# Patient Record
Sex: FEMALE | Race: WHITE | NOT HISPANIC OR LATINO | URBAN - METROPOLITAN AREA
[De-identification: names, ages, dates, MRNs, and addresses within clinical notes are randomized per-mention and may not be internally consistent; named-entity substitution may affect disease eponyms.]

---

## 2023-09-30 NOTE — H&P (VIEW-ONLY)
History of Present Illness:   Admission Reason: Left femur fracture   HPI:    KRISTAL SHAH is a 67 year old Female with pertinent medical history of hypothyroidism who presented to [facility] following a mechanical fall on her left hip  from home.  Patient denies having any other medical history or surgeries.  Patient reports that she was cleaning and had pictures in her hand and turned around and tripped on a board landing on her left hip.  She attempted to get up and was unable to  walk.  Patient denies LOC or hitting her head.  Patient denies use of blood thinners/anticoagulation.  Patient denies recent: fever, chills, headache, dizziness, chest pain/ palpitations, shortness of breath, cough, abdominal pain, N/V/D, dysuria, or  hematuria.     ED course:  Initial VS: Temp 37.0, HR 62, RR 18, /84, SPO2 98% on room air.  CBC with WBC 12.6 likely reactive.  BMP/PT/INR: Unremarkable.  Left hip and femur fracture: Acute impacted and slightly rotated left hip fracture.  Acute left femoral neck fracture.  CT pelvis without contrast: Acute left femoral neck fracture complete, mildly displaced.  Chest x-ray: Negative.  While in the ED patient received morphine 2 mg IV x1 and Zofran 4 mg IV x1.    Patient admitted inpatient with left hip fracture.    Past medical history:Hypothyroidism    Past surgical history:None    Social history:Current tobacco use of 8 to 10 cigarettes/day (on and off since age 16), occasional alcohol usage, denies illicit drug use or medical marijuana card.    Family history: Father:  of an MI at age 89.  Mother: COPD and  from an enlarged heart complications.  Maternal grandmother: Diabetes mellitus.    Past Medical/Surgical History:        Medical History:   Hypothyroidism:     Social History:   Social History:  Smoking Status moderate user (uses 11-30 cig/day, OR 0.5-1.5 ppd, OR 2-3 cans/pouches loose leaf tobacco per week, OR 0.5-1.5 vape pods per day)  (1)   Alcohol  Use occasionally   Drug Use denies (1)              Allergies:  ·  penicillin : Unknown    Medications Prior to Admission:   Admission Medication Reconciliation has not been completed for this patient.    Review of Systems:   Constitutional: NEGATIVE: Fever, Chills     Eyes: NEGATIVE: Blurry Vision, Redness, Vision Loss/  Change     ENMT: NEGATIVE: Nasal Discharge, Nasal Congestion,  Throat Pain     Respiratory: NEGATIVE: Dry Cough, Productive Cough,  Shortness of Breath     Cardiac: NEGATIVE: Chest Pain, Palpitations, Syncope     Gastrointestinal: NEGATIVE: Nausea, Vomiting, Diarrhea,  Constipation, Abdominal Pain     Genitourinary: NEGATIVE: Dysuria, Frequency, Hematuria     Musculoskeletal: POSITIVE: Decreased ROM, Pain, Weakness     Neurological: NEGATIVE: Dizziness, Headache     Psychiatric: NEGATIVE: Anxiety     Skin: NEGATIVE: Rash, Ulcer     Endocrine: NEGATIVE: Polyuria     Hematologic/Lymph: NEGATIVE: Bruising     Allergic/Immunologic: NEGATIVE: Itching       Objective:     Objective Information:      T   P  R  BP   MAP  SpO2   Value  36.1  69  18  148/84   96  95%  Date/Time 9/18 23:35 9/18 23:35 9/18 23:35 9/18 23:35  9/18 23:35 9/18 23:35  Range  (36.1C - 37C )  (60 - 69 )  (18 - 18 )  (136 - 148 )/ (83 - 84 )  (96 - 96 )  (95% - 99% )  Highest temp of 37 C was recorded at 9/18 19:48      Pain reported at 9/18 19:48: 7 = Severe     Weights   9/18 23:46: Weight in kg (Weight (kg))  74.8  9/18 23:46: Weight in lbs ((lbs))  164.9  9/18 23:46: BMI (kg/m2) (BMI (kg/m2))  27.441    Physical Exam by System:    Constitutional: Awake/alert, calm, pleasant, and  cooperative.  Clear speech. no acute distress noted.   Eyes: PERRL, clear sclera, no swelling or draining  noted   ENMT: mucous membranes pink and moist, no apparent  injury, no lesions seen   Head/Neck: Neck supple, no apparent injury, trachea  midline, no palpable masses on head.   Respiratory/Thorax: CTAB, respirations even and unlabored,  symmetrical  chest rise   Cardiovascular: Regular rhythm and rate, auscultated  S1 and S2, no murmurs   Gastrointestinal: Nondistended, soft, non-tender,  no rebound tenderness or guarding, positive bowel sounds in all quadrants   Musculoskeletal: Decreased range of motion and strength  in left lower extremity, no joint swelling   Extremities: no edema noted, no cyanosis, 1+ pulses  of the extremities, see skin assessment for wounds   Neurological: alert and oriented x3, intact senses,  bilateral hand grasp and foot push/pulls equal.   Psychological: Appropriate mood and behavior   Skin: Pink, warm, and dry. No lesions or rashes     Medications:    Medications:          Continuous Medications       --------------------------------  No continuous medications are active       Scheduled Medications       --------------------------------    1. diazePAM (VALIUM):  5  mg  Oral  Once    2. Levothyroxine:  150  microgram(s)  Oral  Daily    3. Lidocaine 4% TransDermal:  1  patch  TransDermal  Every 24 Hours    4. Nicotine 21 mg/ 24 hour TransDermal:  1  patch  TransDermal  Every 24 Hours         PRN Medications       --------------------------------    1. diazePAM (VALIUM):  5  mg  Oral  Every 6 Hours    2. Morphine Injectable:  1  mg  IntraVenous Push  Every 4 Hours    3. Morphine Injectable:  2  mg  IntraVenous Push  Every 4 Hours    4. Morphine Injectable:  4  mg  IntraVenous Push  Every 4 Hours    5. Ondansetron Injectable:  4  mg  IntraVenous Push  Every 6 Hours    6. Sodium Chloride 0.9% Injectable Flush:  10  mL  IntraVenous Flush  Every 8 Hours and as Needed        Recent Lab Results:    Results:        I have reviewed these laboratory results:    Basic Metabolic Panel  18-Sep-2023 21:59:00      Result Value    Glucose, Serum  89    NA  137    K  3.7    CL  103    Bicarbonate, Serum  26    Anion Gap, Serum  12    BUN  16    CREAT  0.91    GFR Female  70    Calcium, Serum  9.6      Complete Blood Count  18-Sep-2023 21:59:00       Result Value    White Blood Cell Count  12.6   H   Nucleated Erythrocyte Count  0.0    Red Blood Cell Count  4.80    HGB  14.1    HCT  43.9    MCV  91    MCHC  32.1    PLT  239    RDW-CV  14.3      PT + INR, Plasma  18-Sep-2023 21:59:00      Result Value    Prothrombin Time, Plasma  12.6    International Normalized Ratio, Plasma  1.1        Radiology Results:    Results:        Impression:    1.  No evidence of acute cardiopulmonary process.           MACRO:  None     Xray Chest 1 View [Sep 18 2023 11:41PM]      Impression:    Acute left femoral neck fracture        MACRO:  None     Xray Femur Min 2 Views [Sep 18 2023 11:41PM]      Impression:    1.  Acute left femoral neck fracture; complete; mildly displaced.  Demineralization     MACRO:  None     CT Pelvis without Contrast [Sep 18 2023 11:40PM]      Impression:    Acute, impacted and slightly rotated left hip fracture.        MACRO:  None     Xray Hip 2 View [Sep 18 2023  9:18PM]      Assessment and Plan:   Problem List:       Admitting Dx:   Fracture of left hip:        Medical History:   Hypothyroidism:     Assessment:    Left hip fracture  Altered mobility  Leukocytosis  Hypothyroidism  Nicotine dependence    Plan:  Consult: Cardiology, orthopedics.  IVFs: LR at 75 MLS per hour x2 L .  Meds: Morphine 1 mg IV every 4 hours as needed for mild pain 1-3, morphine  2 mg IV every 4 hours as needed for moderate pain 4-6, morphine 4 mg IV every 4 hours as needed for severe pain 7-10, Zofran 4 mg IV every 6 hours as needed for nausea and/or vomiting, nicotine transdermal patch 21 mg per 24 hours, Valium 5 mg p.o. x1,  Valium 5 mg p.o. every 6 hours as needed for muscle spasms, Tylenol 650 mg p.o. every 6 hours x4 doses  Diet: Regular then n.p.o. after midnight except meds .  Vital signs with BP, HR, & RR Q 4 hours.  Pulse ox Q 4 hours.   Admission height and weight.  Labs ordered: CBC, BMP, hepatic panel, magnesium, phosphorus, BNP, lipid  panel.  Test ordered: KG  ".  Monitor / trend labs while inpatient.  Replace electrolytes as needed.  Aspiration precautions .  Out of bed with assistance   Fall precautions  PT/OT eval and treat as indicated once cleared by orthopedics.  Encourage & educate on smoking cessation .  Call physician for temperature < 36.5 or >38.0 degrees celsius.  Call physician for pulse <50 or >120.  Call physician for respiratory rate <10 or >22.  Call physician for systolic blood pressure <90 or >170.  Call physician for diastolic blood pressure < 50 or >100.  Call physician for pulse ox <92%.  See orders for further plan of care ordered.     VTE prophylaxis:  BLE SCDs.  Coagulation deferred to attending/consults  as patient to go to the OR on 9/19/2023.  Monitor for s/s of bleeding    Further evaluation, consults, and management per attending and consulting physicians.    This note has been transcribed using Dragon voice recognition system and there is a possibility of unintentional typing misprints.  Any information found  to be copied from previous providers is done in the best interest of the patient to provide accurate, quality, and continuity of care.     Plan of Care Reviewed With:  Plan of Care Reviewed With: patient; son     Attestation:   Note Completion:        Electronic Signatures:  Larry Norton)  (Signed 19-Sep-2023 12:49)   Co-Signer: Note Completion  Flower Corbin (APRN-CNP)  (Signed 28-Sep-2023 04:12)   Authored: History of Present Illness, Comorbidities,  Past Medical/Surgical History, Social History, Allergies, Medications Prior to Admission, Review of Systems, Objective, Assessment and Plan, Note Completion      Last Updated: 28-Sep-2023 04:12 by Flower Corbin (APRN-CNP)    References:  1.  Data Referenced From \"Patient Profile - Adult v2\" 18-Sep-2023 23:46   "

## 2023-10-08 PROBLEM — Z96.642 S/P TOTAL LEFT HIP ARTHROPLASTY: Status: ACTIVE | Noted: 2023-10-08

## 2023-10-08 RX ORDER — CEFAZOLIN SODIUM 2 G/100ML
2 INJECTION, SOLUTION INTRAVENOUS ONCE
Status: CANCELLED | OUTPATIENT
Start: 2023-10-08 | End: 2023-10-08

## 2023-10-09 ENCOUNTER — LAB (OUTPATIENT)
Dept: LAB | Facility: LAB | Age: 68
End: 2023-10-09
Payer: COMMERCIAL

## 2023-10-10 RX ORDER — ASPIRIN 81 MG/1
81 TABLET ORAL DAILY
COMMUNITY
End: 2023-10-12 | Stop reason: HOSPADM

## 2023-10-10 RX ORDER — DOCUSATE SODIUM 100 MG/1
100 CAPSULE, LIQUID FILLED ORAL 2 TIMES DAILY PRN
COMMUNITY

## 2023-10-10 RX ORDER — HYDROCODONE BITARTRATE AND ACETAMINOPHEN 5; 325 MG/1; MG/1
1 TABLET ORAL EVERY 4 HOURS PRN
Status: ON HOLD | COMMUNITY
End: 2023-10-12 | Stop reason: SDUPTHER

## 2023-10-10 RX ORDER — LEVOTHYROXINE SODIUM 150 UG/1
150 TABLET ORAL
COMMUNITY

## 2023-10-10 RX ORDER — IBUPROFEN 400 MG/1
400 TABLET ORAL EVERY 6 HOURS PRN
COMMUNITY

## 2023-10-11 ENCOUNTER — APPOINTMENT (OUTPATIENT)
Dept: RADIOLOGY | Facility: HOSPITAL | Age: 68
End: 2023-10-11
Payer: COMMERCIAL

## 2023-10-11 ENCOUNTER — HOSPITAL ENCOUNTER (OUTPATIENT)
Facility: HOSPITAL | Age: 68
Discharge: HOME | End: 2023-10-12
Attending: ORTHOPAEDIC SURGERY | Admitting: ORTHOPAEDIC SURGERY
Payer: COMMERCIAL

## 2023-10-11 ENCOUNTER — ANESTHESIA EVENT (OUTPATIENT)
Dept: OPERATING ROOM | Facility: HOSPITAL | Age: 68
End: 2023-10-11
Payer: COMMERCIAL

## 2023-10-11 ENCOUNTER — ANESTHESIA (OUTPATIENT)
Dept: OPERATING ROOM | Facility: HOSPITAL | Age: 68
End: 2023-10-11
Payer: COMMERCIAL

## 2023-10-11 DIAGNOSIS — Z96.642 S/P TOTAL LEFT HIP ARTHROPLASTY: Primary | ICD-10-CM

## 2023-10-11 LAB
ABO GROUP (TYPE) IN BLOOD: NORMAL
ALBUMIN SERPL BCP-MCNC: 4.1 G/DL (ref 3.4–5)
ALP SERPL-CCNC: 72 U/L (ref 33–136)
ALT SERPL W P-5'-P-CCNC: 6 U/L (ref 7–45)
ANION GAP SERPL CALC-SCNC: 12 MMOL/L (ref 10–20)
ANTIBODY SCREEN: NORMAL
AST SERPL W P-5'-P-CCNC: 11 U/L (ref 9–39)
BILIRUB SERPL-MCNC: 0.5 MG/DL (ref 0–1.2)
BUN SERPL-MCNC: 17 MG/DL (ref 6–23)
CALCIUM SERPL-MCNC: 10 MG/DL (ref 8.6–10.3)
CHLORIDE SERPL-SCNC: 105 MMOL/L (ref 98–107)
CO2 SERPL-SCNC: 26 MMOL/L (ref 21–32)
CREAT SERPL-MCNC: 0.76 MG/DL (ref 0.5–1.05)
ERYTHROCYTE [DISTWIDTH] IN BLOOD BY AUTOMATED COUNT: 14.3 % (ref 11.5–14.5)
GFR SERPL CREATININE-BSD FRML MDRD: 87 ML/MIN/1.73M*2
GLUCOSE SERPL-MCNC: 90 MG/DL (ref 74–99)
HCT VFR BLD AUTO: 43.8 % (ref 36–46)
HGB BLD-MCNC: 14 G/DL (ref 12–16)
MCH RBC QN AUTO: 29.2 PG (ref 26–34)
MCHC RBC AUTO-ENTMCNC: 32 G/DL (ref 32–36)
MCV RBC AUTO: 91 FL (ref 80–100)
NRBC BLD-RTO: 0 /100 WBCS (ref 0–0)
PLATELET # BLD AUTO: 294 X10*3/UL (ref 150–450)
PMV BLD AUTO: 11.6 FL (ref 7.5–11.5)
POTASSIUM SERPL-SCNC: 4.1 MMOL/L (ref 3.5–5.3)
PROT SERPL-MCNC: 7.5 G/DL (ref 6.4–8.2)
RBC # BLD AUTO: 4.8 X10*6/UL (ref 4–5.2)
RH FACTOR (ANTIGEN D): NORMAL
SODIUM SERPL-SCNC: 139 MMOL/L (ref 136–145)
WBC # BLD AUTO: 9.7 X10*3/UL (ref 4.4–11.3)

## 2023-10-11 PROCEDURE — C1713 ANCHOR/SCREW BN/BN,TIS/BN: HCPCS | Performed by: ORTHOPAEDIC SURGERY

## 2023-10-11 PROCEDURE — 2500000004 HC RX 250 GENERAL PHARMACY W/ HCPCS (ALT 636 FOR OP/ED): Performed by: ORTHOPAEDIC SURGERY

## 2023-10-11 PROCEDURE — 36415 COLL VENOUS BLD VENIPUNCTURE: CPT | Performed by: ORTHOPAEDIC SURGERY

## 2023-10-11 PROCEDURE — 94640 AIRWAY INHALATION TREATMENT: CPT

## 2023-10-11 PROCEDURE — 76000 FLUOROSCOPY <1 HR PHYS/QHP: CPT | Mod: 59

## 2023-10-11 PROCEDURE — A27132 PR CONV PREV HIP SURG TO TOT HIP ARTHROPLAS: Performed by: STUDENT IN AN ORGANIZED HEALTH CARE EDUCATION/TRAINING PROGRAM

## 2023-10-11 PROCEDURE — 7100000011 HC EXTENDED STAY RECOVERY HOURLY - NURSING UNIT

## 2023-10-11 PROCEDURE — A27132 PR CONV PREV HIP SURG TO TOT HIP ARTHROPLAS: Performed by: NURSE ANESTHETIST, CERTIFIED REGISTERED

## 2023-10-11 PROCEDURE — 72170 X-RAY EXAM OF PELVIS: CPT | Performed by: RADIOLOGY

## 2023-10-11 PROCEDURE — 2780000003 HC OR 278 NO HCPCS: Performed by: ORTHOPAEDIC SURGERY

## 2023-10-11 PROCEDURE — 7100000001 HC RECOVERY ROOM TIME - INITIAL BASE CHARGE: Performed by: ORTHOPAEDIC SURGERY

## 2023-10-11 PROCEDURE — 2500000004 HC RX 250 GENERAL PHARMACY W/ HCPCS (ALT 636 FOR OP/ED): Performed by: NURSE ANESTHETIST, CERTIFIED REGISTERED

## 2023-10-11 PROCEDURE — 2580000001 HC RX 258 IV SOLUTIONS: Performed by: ORTHOPAEDIC SURGERY

## 2023-10-11 PROCEDURE — 2580000001 HC RX 258 IV SOLUTIONS: Performed by: NURSE ANESTHETIST, CERTIFIED REGISTERED

## 2023-10-11 PROCEDURE — 3600000017 HC OR TIME - EACH INCREMENTAL 1 MINUTE - PROCEDURE LEVEL SIX: Performed by: ORTHOPAEDIC SURGERY

## 2023-10-11 PROCEDURE — A9999 DME SUPPLY OR ACCESSORY, NOS: HCPCS | Mod: MUE | Performed by: ORTHOPAEDIC SURGERY

## 2023-10-11 PROCEDURE — 3600000018 HC OR TIME - INITIAL BASE CHARGE - PROCEDURE LEVEL SIX: Performed by: ORTHOPAEDIC SURGERY

## 2023-10-11 PROCEDURE — 72170 X-RAY EXAM OF PELVIS: CPT

## 2023-10-11 PROCEDURE — 2500000005 HC RX 250 GENERAL PHARMACY W/O HCPCS: Performed by: ORTHOPAEDIC SURGERY

## 2023-10-11 PROCEDURE — 2500000005 HC RX 250 GENERAL PHARMACY W/O HCPCS: Performed by: NURSE ANESTHETIST, CERTIFIED REGISTERED

## 2023-10-11 PROCEDURE — 7100000002 HC RECOVERY ROOM TIME - EACH INCREMENTAL 1 MINUTE: Performed by: ORTHOPAEDIC SURGERY

## 2023-10-11 PROCEDURE — 80053 COMPREHEN METABOLIC PANEL: CPT | Performed by: ORTHOPAEDIC SURGERY

## 2023-10-11 PROCEDURE — A6213 FOAM DRG >16<=48 SQ IN W/BDR: HCPCS | Performed by: ORTHOPAEDIC SURGERY

## 2023-10-11 PROCEDURE — 85027 COMPLETE CBC AUTOMATED: CPT | Performed by: ORTHOPAEDIC SURGERY

## 2023-10-11 PROCEDURE — 86850 RBC ANTIBODY SCREEN: CPT | Performed by: ORTHOPAEDIC SURGERY

## 2023-10-11 PROCEDURE — 2500000001 HC RX 250 WO HCPCS SELF ADMINISTERED DRUGS (ALT 637 FOR MEDICARE OP): Performed by: ORTHOPAEDIC SURGERY

## 2023-10-11 PROCEDURE — 2500000002 HC RX 250 W HCPCS SELF ADMINISTERED DRUGS (ALT 637 FOR MEDICARE OP, ALT 636 FOR OP/ED): Performed by: NURSE ANESTHETIST, CERTIFIED REGISTERED

## 2023-10-11 PROCEDURE — 2500000004 HC RX 250 GENERAL PHARMACY W/ HCPCS (ALT 636 FOR OP/ED): Performed by: STUDENT IN AN ORGANIZED HEALTH CARE EDUCATION/TRAINING PROGRAM

## 2023-10-11 PROCEDURE — 3700000001 HC GENERAL ANESTHESIA TIME - INITIAL BASE CHARGE: Performed by: ORTHOPAEDIC SURGERY

## 2023-10-11 PROCEDURE — C1776 JOINT DEVICE (IMPLANTABLE): HCPCS | Performed by: ORTHOPAEDIC SURGERY

## 2023-10-11 PROCEDURE — 86901 BLOOD TYPING SEROLOGIC RH(D): CPT | Performed by: ORTHOPAEDIC SURGERY

## 2023-10-11 PROCEDURE — 3700000002 HC GENERAL ANESTHESIA TIME - EACH INCREMENTAL 1 MINUTE: Performed by: ORTHOPAEDIC SURGERY

## 2023-10-11 PROCEDURE — 2720000007 HC OR 272 NO HCPCS: Performed by: ORTHOPAEDIC SURGERY

## 2023-10-11 DEVICE — IMPLANTABLE DEVICE: Type: IMPLANTABLE DEVICE | Site: HIP | Status: FUNCTIONAL

## 2023-10-11 RX ORDER — ONDANSETRON HYDROCHLORIDE 2 MG/ML
4 INJECTION, SOLUTION INTRAVENOUS ONCE AS NEEDED
Status: DISCONTINUED | OUTPATIENT
Start: 2023-10-11 | End: 2023-10-11

## 2023-10-11 RX ORDER — LIDOCAINE HYDROCHLORIDE 20 MG/ML
INJECTION, SOLUTION EPIDURAL; INFILTRATION; INTRACAUDAL; PERINEURAL AS NEEDED
Status: DISCONTINUED | OUTPATIENT
Start: 2023-10-11 | End: 2023-10-11

## 2023-10-11 RX ORDER — ROCURONIUM BROMIDE 50 MG/5 ML
SYRINGE (ML) INTRAVENOUS AS NEEDED
Status: DISCONTINUED | OUTPATIENT
Start: 2023-10-11 | End: 2023-10-11

## 2023-10-11 RX ORDER — OXYCODONE HYDROCHLORIDE 10 MG/1
10 TABLET ORAL EVERY 4 HOURS PRN
Status: DISCONTINUED | OUTPATIENT
Start: 2023-10-11 | End: 2023-10-12 | Stop reason: HOSPADM

## 2023-10-11 RX ORDER — PHENYLEPHRINE HCL IN 0.9% NACL 1 MG/10 ML
SYRINGE (ML) INTRAVENOUS AS NEEDED
Status: DISCONTINUED | OUTPATIENT
Start: 2023-10-11 | End: 2023-10-11

## 2023-10-11 RX ORDER — ONDANSETRON HYDROCHLORIDE 2 MG/ML
4 INJECTION, SOLUTION INTRAVENOUS EVERY 8 HOURS PRN
Status: DISCONTINUED | OUTPATIENT
Start: 2023-10-11 | End: 2023-10-12 | Stop reason: HOSPADM

## 2023-10-11 RX ORDER — HYDROMORPHONE HYDROCHLORIDE 1 MG/ML
0.5 INJECTION, SOLUTION INTRAMUSCULAR; INTRAVENOUS; SUBCUTANEOUS EVERY 5 MIN PRN
Status: DISCONTINUED | OUTPATIENT
Start: 2023-10-11 | End: 2023-10-11 | Stop reason: HOSPADM

## 2023-10-11 RX ORDER — TRANEXAMIC ACID 100 MG/ML
INJECTION, SOLUTION INTRAVENOUS AS NEEDED
Status: DISCONTINUED | OUTPATIENT
Start: 2023-10-11 | End: 2023-10-11

## 2023-10-11 RX ORDER — POLYETHYLENE GLYCOL 3350 17 G/17G
17 POWDER, FOR SOLUTION ORAL DAILY
Status: DISCONTINUED | OUTPATIENT
Start: 2023-10-11 | End: 2023-10-12 | Stop reason: HOSPADM

## 2023-10-11 RX ORDER — NALOXONE HYDROCHLORIDE 0.4 MG/ML
0.2 INJECTION, SOLUTION INTRAMUSCULAR; INTRAVENOUS; SUBCUTANEOUS EVERY 5 MIN PRN
Status: DISCONTINUED | OUTPATIENT
Start: 2023-10-11 | End: 2023-10-12 | Stop reason: HOSPADM

## 2023-10-11 RX ORDER — ACETAMINOPHEN 325 MG/1
650 TABLET ORAL EVERY 6 HOURS SCHEDULED
Status: DISCONTINUED | OUTPATIENT
Start: 2023-10-11 | End: 2023-10-12 | Stop reason: HOSPADM

## 2023-10-11 RX ORDER — CEFAZOLIN SODIUM 2 G/100ML
INJECTION, SOLUTION INTRAVENOUS AS NEEDED
Status: DISCONTINUED | OUTPATIENT
Start: 2023-10-11 | End: 2023-10-11

## 2023-10-11 RX ORDER — DEXAMETHASONE SODIUM PHOSPHATE 4 MG/ML
INJECTION, SOLUTION INTRA-ARTICULAR; INTRALESIONAL; INTRAMUSCULAR; INTRAVENOUS; SOFT TISSUE AS NEEDED
Status: DISCONTINUED | OUTPATIENT
Start: 2023-10-11 | End: 2023-10-11

## 2023-10-11 RX ORDER — ROPIVACAINE/EPI/CLONIDINE/KET 2.46-0.005
SYRINGE (ML) INJECTION AS NEEDED
Status: DISCONTINUED | OUTPATIENT
Start: 2023-10-11 | End: 2023-10-11 | Stop reason: HOSPADM

## 2023-10-11 RX ORDER — SODIUM CHLORIDE, SODIUM LACTATE, POTASSIUM CHLORIDE, CALCIUM CHLORIDE 600; 310; 30; 20 MG/100ML; MG/100ML; MG/100ML; MG/100ML
50 INJECTION, SOLUTION INTRAVENOUS CONTINUOUS
Status: DISCONTINUED | OUTPATIENT
Start: 2023-10-11 | End: 2023-10-12 | Stop reason: HOSPADM

## 2023-10-11 RX ORDER — SODIUM CHLORIDE, SODIUM LACTATE, POTASSIUM CHLORIDE, CALCIUM CHLORIDE 600; 310; 30; 20 MG/100ML; MG/100ML; MG/100ML; MG/100ML
100 INJECTION, SOLUTION INTRAVENOUS CONTINUOUS
Status: DISCONTINUED | OUTPATIENT
Start: 2023-10-11 | End: 2023-10-11 | Stop reason: HOSPADM

## 2023-10-11 RX ORDER — ALBUTEROL SULFATE 90 UG/1
AEROSOL, METERED RESPIRATORY (INHALATION) AS NEEDED
Status: DISCONTINUED | OUTPATIENT
Start: 2023-10-11 | End: 2023-10-11

## 2023-10-11 RX ORDER — ALBUTEROL SULFATE 0.83 MG/ML
2.5 SOLUTION RESPIRATORY (INHALATION) ONCE AS NEEDED
Status: DISCONTINUED | OUTPATIENT
Start: 2023-10-11 | End: 2023-10-11 | Stop reason: HOSPADM

## 2023-10-11 RX ORDER — CEFAZOLIN SODIUM 2 G/100ML
2 INJECTION, SOLUTION INTRAVENOUS EVERY 8 HOURS
Status: COMPLETED | OUTPATIENT
Start: 2023-10-11 | End: 2023-10-12

## 2023-10-11 RX ORDER — FENTANYL CITRATE 50 UG/ML
50 INJECTION, SOLUTION INTRAMUSCULAR; INTRAVENOUS EVERY 5 MIN PRN
Status: DISCONTINUED | OUTPATIENT
Start: 2023-10-11 | End: 2023-10-11 | Stop reason: HOSPADM

## 2023-10-11 RX ORDER — LABETALOL HYDROCHLORIDE 5 MG/ML
INJECTION, SOLUTION INTRAVENOUS AS NEEDED
Status: DISCONTINUED | OUTPATIENT
Start: 2023-10-11 | End: 2023-10-11

## 2023-10-11 RX ORDER — LIDOCAINE HYDROCHLORIDE 10 MG/ML
0.1 INJECTION, SOLUTION EPIDURAL; INFILTRATION; INTRACAUDAL; PERINEURAL ONCE
Status: DISCONTINUED | OUTPATIENT
Start: 2023-10-11 | End: 2023-10-11 | Stop reason: HOSPADM

## 2023-10-11 RX ORDER — HYDRALAZINE HYDROCHLORIDE 20 MG/ML
5 INJECTION INTRAMUSCULAR; INTRAVENOUS EVERY 30 MIN PRN
Status: DISCONTINUED | OUTPATIENT
Start: 2023-10-11 | End: 2023-10-11 | Stop reason: HOSPADM

## 2023-10-11 RX ORDER — VANCOMYCIN HYDROCHLORIDE 1 G/20ML
INJECTION, POWDER, LYOPHILIZED, FOR SOLUTION INTRAVENOUS AS NEEDED
Status: DISCONTINUED | OUTPATIENT
Start: 2023-10-11 | End: 2023-10-11 | Stop reason: HOSPADM

## 2023-10-11 RX ORDER — FENTANYL CITRATE 50 UG/ML
INJECTION, SOLUTION INTRAMUSCULAR; INTRAVENOUS AS NEEDED
Status: DISCONTINUED | OUTPATIENT
Start: 2023-10-11 | End: 2023-10-11

## 2023-10-11 RX ORDER — PROPOFOL 10 MG/ML
INJECTION, EMULSION INTRAVENOUS AS NEEDED
Status: DISCONTINUED | OUTPATIENT
Start: 2023-10-11 | End: 2023-10-11

## 2023-10-11 RX ORDER — ONDANSETRON HYDROCHLORIDE 2 MG/ML
INJECTION, SOLUTION INTRAVENOUS AS NEEDED
Status: DISCONTINUED | OUTPATIENT
Start: 2023-10-11 | End: 2023-10-11

## 2023-10-11 RX ORDER — LABETALOL HYDROCHLORIDE 5 MG/ML
5 INJECTION, SOLUTION INTRAVENOUS ONCE AS NEEDED
Status: DISCONTINUED | OUTPATIENT
Start: 2023-10-11 | End: 2023-10-11 | Stop reason: HOSPADM

## 2023-10-11 RX ORDER — MIDAZOLAM HYDROCHLORIDE 1 MG/ML
INJECTION, SOLUTION INTRAMUSCULAR; INTRAVENOUS AS NEEDED
Status: DISCONTINUED | OUTPATIENT
Start: 2023-10-11 | End: 2023-10-11

## 2023-10-11 RX ORDER — ASPIRIN 81 MG/1
81 TABLET ORAL 2 TIMES DAILY
Status: DISCONTINUED | OUTPATIENT
Start: 2023-10-11 | End: 2023-10-12 | Stop reason: HOSPADM

## 2023-10-11 RX ORDER — LEVOTHYROXINE SODIUM 150 UG/1
150 TABLET ORAL
Status: CANCELLED | OUTPATIENT
Start: 2023-10-12

## 2023-10-11 RX ORDER — HYDROMORPHONE HYDROCHLORIDE 1 MG/ML
0.5 INJECTION, SOLUTION INTRAMUSCULAR; INTRAVENOUS; SUBCUTANEOUS ONCE
Status: COMPLETED | OUTPATIENT
Start: 2023-10-11 | End: 2023-10-11

## 2023-10-11 RX ORDER — BISACODYL 10 MG/1
10 SUPPOSITORY RECTAL DAILY PRN
Status: DISCONTINUED | OUTPATIENT
Start: 2023-10-11 | End: 2023-10-12 | Stop reason: HOSPADM

## 2023-10-11 RX ORDER — BISACODYL 5 MG
10 TABLET, DELAYED RELEASE (ENTERIC COATED) ORAL DAILY PRN
Status: DISCONTINUED | OUTPATIENT
Start: 2023-10-11 | End: 2023-10-12 | Stop reason: HOSPADM

## 2023-10-11 RX ORDER — KETOROLAC TROMETHAMINE 15 MG/ML
15 INJECTION, SOLUTION INTRAMUSCULAR; INTRAVENOUS EVERY 6 HOURS
Status: COMPLETED | OUTPATIENT
Start: 2023-10-11 | End: 2023-10-12

## 2023-10-11 RX ORDER — OXYCODONE HYDROCHLORIDE 5 MG/1
5 TABLET ORAL EVERY 4 HOURS PRN
Status: DISCONTINUED | OUTPATIENT
Start: 2023-10-11 | End: 2023-10-11 | Stop reason: HOSPADM

## 2023-10-11 RX ORDER — ONDANSETRON 4 MG/1
4 TABLET, ORALLY DISINTEGRATING ORAL EVERY 8 HOURS PRN
Status: DISCONTINUED | OUTPATIENT
Start: 2023-10-11 | End: 2023-10-12 | Stop reason: HOSPADM

## 2023-10-11 RX ORDER — OXYCODONE HYDROCHLORIDE 5 MG/1
5 TABLET ORAL EVERY 6 HOURS PRN
Status: DISCONTINUED | OUTPATIENT
Start: 2023-10-11 | End: 2023-10-12 | Stop reason: HOSPADM

## 2023-10-11 RX ORDER — CYCLOBENZAPRINE HCL 10 MG
10 TABLET ORAL 3 TIMES DAILY PRN
Status: DISCONTINUED | OUTPATIENT
Start: 2023-10-11 | End: 2023-10-12 | Stop reason: HOSPADM

## 2023-10-11 RX ORDER — KETAMINE HCL IN NACL, ISO-OSM 100MG/10ML
SYRINGE (ML) INJECTION AS NEEDED
Status: DISCONTINUED | OUTPATIENT
Start: 2023-10-11 | End: 2023-10-11

## 2023-10-11 RX ADMIN — ACETAMINOPHEN 650 MG: 325 TABLET ORAL at 23:15

## 2023-10-11 RX ADMIN — Medication 50 MG: at 12:55

## 2023-10-11 RX ADMIN — HYDROMORPHONE HYDROCHLORIDE 0.5 MG: 1 INJECTION, SOLUTION INTRAMUSCULAR; INTRAVENOUS; SUBCUTANEOUS at 18:47

## 2023-10-11 RX ADMIN — HYDROMORPHONE HYDROCHLORIDE 0.5 MG: 1 INJECTION, SOLUTION INTRAMUSCULAR; INTRAVENOUS; SUBCUTANEOUS at 10:20

## 2023-10-11 RX ADMIN — Medication 30 MG: at 13:45

## 2023-10-11 RX ADMIN — CEFAZOLIN SODIUM 2 G: 2 INJECTION, SOLUTION INTRAVENOUS at 21:19

## 2023-10-11 RX ADMIN — OXYCODONE HYDROCHLORIDE 5 MG: 5 TABLET ORAL at 17:59

## 2023-10-11 RX ADMIN — ALBUTEROL SULFATE 5 PUFF: 108 INHALANT RESPIRATORY (INHALATION) at 15:33

## 2023-10-11 RX ADMIN — ASPIRIN 81 MG: 81 TABLET, COATED ORAL at 21:19

## 2023-10-11 RX ADMIN — TRANEXAMIC ACID 1000 MG: 1 INJECTION, SOLUTION INTRAVENOUS at 15:23

## 2023-10-11 RX ADMIN — KETOROLAC TROMETHAMINE 15 MG: 15 INJECTION, SOLUTION INTRAMUSCULAR; INTRAVENOUS at 17:59

## 2023-10-11 RX ADMIN — ACETAMINOPHEN 650 MG: 325 TABLET ORAL at 17:59

## 2023-10-11 RX ADMIN — FENTANYL CITRATE 50 MCG: 50 INJECTION, SOLUTION INTRAMUSCULAR; INTRAVENOUS at 16:29

## 2023-10-11 RX ADMIN — Medication 10 MG: at 15:02

## 2023-10-11 RX ADMIN — FENTANYL CITRATE 100 MCG: 50 INJECTION, SOLUTION INTRAMUSCULAR; INTRAVENOUS at 12:55

## 2023-10-11 RX ADMIN — PROPOFOL 200 MG: 10 INJECTION, EMULSION INTRAVENOUS at 12:55

## 2023-10-11 RX ADMIN — DEXAMETHASONE SODIUM PHOSPHATE 4 MG: 4 INJECTION INTRA-ARTICULAR; INTRALESIONAL; INTRAMUSCULAR; INTRAVENOUS; SOFT TISSUE at 13:53

## 2023-10-11 RX ADMIN — CEFAZOLIN SODIUM 2 G: 2 INJECTION, SOLUTION INTRAVENOUS at 13:04

## 2023-10-11 RX ADMIN — Medication 200 MCG: at 13:05

## 2023-10-11 RX ADMIN — Medication 20 MG: at 13:25

## 2023-10-11 RX ADMIN — HYDROMORPHONE HYDROCHLORIDE 0.5 MG: 1 INJECTION, SOLUTION INTRAMUSCULAR; INTRAVENOUS; SUBCUTANEOUS at 16:18

## 2023-10-11 RX ADMIN — FENTANYL CITRATE 50 MCG: 50 INJECTION, SOLUTION INTRAMUSCULAR; INTRAVENOUS at 16:41

## 2023-10-11 RX ADMIN — Medication 30 MG: at 13:21

## 2023-10-11 RX ADMIN — ALBUTEROL SULFATE 3 PUFF: 108 INHALANT RESPIRATORY (INHALATION) at 15:48

## 2023-10-11 RX ADMIN — LABETALOL HYDROCHLORIDE 5 MG: 5 INJECTION, SOLUTION INTRAVENOUS at 13:39

## 2023-10-11 RX ADMIN — Medication 10 MG: at 14:36

## 2023-10-11 RX ADMIN — Medication 2 L/MIN: at 17:45

## 2023-10-11 RX ADMIN — TRANEXAMIC ACID 1000 MG: 1 INJECTION, SOLUTION INTRAVENOUS at 13:15

## 2023-10-11 RX ADMIN — MIDAZOLAM 2 MG: 1 INJECTION INTRAMUSCULAR; INTRAVENOUS at 12:55

## 2023-10-11 RX ADMIN — KETOROLAC TROMETHAMINE 15 MG: 15 INJECTION, SOLUTION INTRAMUSCULAR; INTRAVENOUS at 23:15

## 2023-10-11 RX ADMIN — SODIUM CHLORIDE, POTASSIUM CHLORIDE, SODIUM LACTATE AND CALCIUM CHLORIDE 50 ML/HR: 600; 310; 30; 20 INJECTION, SOLUTION INTRAVENOUS at 17:45

## 2023-10-11 RX ADMIN — LIDOCAINE HYDROCHLORIDE 100 MG: 20 INJECTION, SOLUTION EPIDURAL; INFILTRATION; INTRACAUDAL; PERINEURAL at 12:55

## 2023-10-11 RX ADMIN — ONDANSETRON 4 MG: 2 INJECTION INTRAMUSCULAR; INTRAVENOUS at 15:21

## 2023-10-11 RX ADMIN — SODIUM CHLORIDE, SODIUM LACTATE, POTASSIUM CHLORIDE, AND CALCIUM CHLORIDE: 600; 310; 30; 20 INJECTION, SOLUTION INTRAVENOUS at 12:40

## 2023-10-11 RX ADMIN — SUGAMMADEX 200 MG: 100 INJECTION, SOLUTION INTRAVENOUS at 15:48

## 2023-10-11 SDOH — SOCIAL STABILITY: SOCIAL INSECURITY: ARE THERE ANY APPARENT SIGNS OF INJURIES/BEHAVIORS THAT COULD BE RELATED TO ABUSE/NEGLECT?: NO

## 2023-10-11 SDOH — SOCIAL STABILITY: SOCIAL INSECURITY: HAS ANYONE EVER THREATENED TO HURT YOUR FAMILY OR YOUR PETS?: NO

## 2023-10-11 SDOH — SOCIAL STABILITY: SOCIAL INSECURITY: DOES ANYONE TRY TO KEEP YOU FROM HAVING/CONTACTING OTHER FRIENDS OR DOING THINGS OUTSIDE YOUR HOME?: NO

## 2023-10-11 SDOH — SOCIAL STABILITY: SOCIAL INSECURITY: ARE YOU OR HAVE YOU BEEN THREATENED OR ABUSED PHYSICALLY, EMOTIONALLY, OR SEXUALLY BY ANYONE?: NO

## 2023-10-11 SDOH — HEALTH STABILITY: MENTAL HEALTH: CURRENT SMOKER: 1

## 2023-10-11 SDOH — SOCIAL STABILITY: SOCIAL INSECURITY: DO YOU FEEL ANYONE HAS EXPLOITED OR TAKEN ADVANTAGE OF YOU FINANCIALLY OR OF YOUR PERSONAL PROPERTY?: NO

## 2023-10-11 SDOH — SOCIAL STABILITY: SOCIAL INSECURITY: WERE YOU ABLE TO COMPLETE ALL THE BEHAVIORAL HEALTH SCREENINGS?: YES

## 2023-10-11 SDOH — SOCIAL STABILITY: SOCIAL INSECURITY: ABUSE: ADULT

## 2023-10-11 SDOH — SOCIAL STABILITY: SOCIAL INSECURITY: HAVE YOU HAD THOUGHTS OF HARMING ANYONE ELSE?: NO

## 2023-10-11 SDOH — SOCIAL STABILITY: SOCIAL INSECURITY: DO YOU FEEL UNSAFE GOING BACK TO THE PLACE WHERE YOU ARE LIVING?: NO

## 2023-10-11 ASSESSMENT — COGNITIVE AND FUNCTIONAL STATUS - GENERAL
DAILY ACTIVITIY SCORE: 24
MOBILITY SCORE: 24
MOBILITY SCORE: 24
DAILY ACTIVITIY SCORE: 24
PATIENT BASELINE BEDBOUND: NO

## 2023-10-11 ASSESSMENT — PAIN - FUNCTIONAL ASSESSMENT
PAIN_FUNCTIONAL_ASSESSMENT: 0-10

## 2023-10-11 ASSESSMENT — PATIENT HEALTH QUESTIONNAIRE - PHQ9
1. LITTLE INTEREST OR PLEASURE IN DOING THINGS: NOT AT ALL
2. FEELING DOWN, DEPRESSED OR HOPELESS: NOT AT ALL
SUM OF ALL RESPONSES TO PHQ9 QUESTIONS 1 & 2: 0

## 2023-10-11 ASSESSMENT — ACTIVITIES OF DAILY LIVING (ADL)
BATHING: INDEPENDENT
WALKS IN HOME: INDEPENDENT
LACK_OF_TRANSPORTATION: NO
GROOMING: INDEPENDENT
PATIENT'S MEMORY ADEQUATE TO SAFELY COMPLETE DAILY ACTIVITIES?: NO
HEARING - LEFT EAR: FUNCTIONAL
DRESSING YOURSELF: INDEPENDENT
JUDGMENT_ADEQUATE_SAFELY_COMPLETE_DAILY_ACTIVITIES: NO
HEARING - RIGHT EAR: FUNCTIONAL
TOILETING: INDEPENDENT
ADEQUATE_TO_COMPLETE_ADL: NO
ASSISTIVE_DEVICE: WALKER
FEEDING YOURSELF: INDEPENDENT

## 2023-10-11 ASSESSMENT — LIFESTYLE VARIABLES
HOW OFTEN DO YOU HAVE A DRINK CONTAINING ALCOHOL: NEVER
SUBSTANCE_ABUSE_PAST_12_MONTHS: NO
AUDIT-C TOTAL SCORE: 0
SKIP TO QUESTIONS 9-10: 1
AUDIT-C TOTAL SCORE: 0
HOW OFTEN DO YOU HAVE 6 OR MORE DRINKS ON ONE OCCASION: NEVER
PRESCIPTION_ABUSE_PAST_12_MONTHS: NO
HOW MANY STANDARD DRINKS CONTAINING ALCOHOL DO YOU HAVE ON A TYPICAL DAY: PATIENT DOES NOT DRINK

## 2023-10-11 ASSESSMENT — COLUMBIA-SUICIDE SEVERITY RATING SCALE - C-SSRS
1. IN THE PAST MONTH, HAVE YOU WISHED YOU WERE DEAD OR WISHED YOU COULD GO TO SLEEP AND NOT WAKE UP?: NO
6. HAVE YOU EVER DONE ANYTHING, STARTED TO DO ANYTHING, OR PREPARED TO DO ANYTHING TO END YOUR LIFE?: NO
2. HAVE YOU ACTUALLY HAD ANY THOUGHTS OF KILLING YOURSELF?: NO

## 2023-10-11 ASSESSMENT — PAIN SCALES - GENERAL
PAINLEVEL_OUTOF10: 4
PAINLEVEL_OUTOF10: 5 - MODERATE PAIN
PAINLEVEL_OUTOF10: 4
PAINLEVEL_OUTOF10: 10 - WORST POSSIBLE PAIN
PAINLEVEL_OUTOF10: 6
PAINLEVEL_OUTOF10: 8
PAINLEVEL_OUTOF10: 6
PAINLEVEL_OUTOF10: 10 - WORST POSSIBLE PAIN
PAINLEVEL_OUTOF10: 7
PAINLEVEL_OUTOF10: 10 - WORST POSSIBLE PAIN
PAINLEVEL_OUTOF10: 6
PAINLEVEL_OUTOF10: 5 - MODERATE PAIN
PAIN_LEVEL: 0

## 2023-10-11 NOTE — ANESTHESIA PROCEDURE NOTES
Airway  Date/Time: 10/11/2023 12:58 PM  Urgency: elective    Airway not difficult    Staffing  Performed: CRNA   Authorized by: Sasha Choudhury MD    Performed by: RUFINA Anne-LEIDY  Patient location during procedure: OR    Indications and Patient Condition  Indications for airway management: anesthesia  Spontaneous ventilation: present  Sedation level: deep  Preoxygenated: yes  Patient position: sniffing  MILS maintained throughout  Mask difficulty assessment: 1 - vent by mask    Final Airway Details  Final airway type: endotracheal airway      Successful airway: ETT  Cuffed: yes   Successful intubation technique: direct laryngoscopy  Blade: Paty  Blade size: #3  ETT size (mm): 7.0  Cormack-Lehane Classification: grade I - full view of glottis  Placement verified by: chest auscultation and capnometry   Measured from: lips  ETT to lips (cm): 21  Number of attempts at approach: 1

## 2023-10-11 NOTE — DISCHARGE INSTR - ACTIVITY
*Call Dr. Bustos for any problems and/or concerns.  *Maintain hip precautions  *Weight bearing as tolerated  *You may shower, do not soak or scrub incision; pat dry  *Apply ice to hip as needed to minimize swelling, on 20 minutes, off 20 minutes  *Continue the coughing and deep breathing exercises that you learned in the hospital  *Move around as much as you can tolerate because movement can help you heal and helps prevent stiffness, skin sores, and blood clots  *Keep Mepilex dressing in place for 7 days after surgery. On post-op day 7 remove dressing and leave incision open to air    Call Doctor right away for:  *Increased hip pain  *Pain or swelling in your calf of leg  *Fever above 100.4/shaking chills  *Excessive swelling, increased redness or drainage around the incision  *Your incision breaks open  *Chest pain/shortness of breath, call 911    After the procedure it is common to have pain and swelling.      -To help prevent hip dislocation, follow instructions about movement restrictions as told by your physician:  *Do not cross your legs at the knees. To remind yourself about this, you may keep a pillow between your legs while lying in bed  *Do not bend at the hip and waist or bend farther than 90 degrees of flexion.   To avoid bending this far: do not bring your knees higher than your hips, do not  something from the floor while sitting in a chair, avoid sitting in low chairs, used raised toilet seat (if needed), when standing up from a seated position- keep injured leg out in front of you  *Avoid twisting at your waist and reaching across your body to the side of the affected leg  *Avoid rotating your toes inward on the affected leg    *When getting into a car:  1. Raise the seat as high as possible, move the seat as far back as it will go, and recline the upper part of the seat slightly  2. Sit down on the seat with your injured leg extended out of the car  3. Scoot back in the seat as you move the  lower half of your body into the car. Try to avoid bumping your foot or leg as you bring it into the car. To make this motion smooth and easy on a cloth seat, try placing a plastic bag on the seat    *If your physician has prescribed compression stockings please use as directed. These stockings help to prevent blood clots and reduce swelling in your legs  *Continue to do the exercises you learned in the hospital: i.e. ankle pumps  *If you were prescribed a blood thinner (anticoagulant), take it as prescribed    *Do not use any products that contain nicotine or tobacco, such as cigarettes and e-cigarettes. These can delay bone healing. If you need help quitting, ask your healthcare provider    If you are taking prescription pain medication, take actions to prevent or treat constipation.  -drink enough fluids to keep your urine pale yellow  -eat foods that are high in fiber, such as fresh fruits and vegetables, whole grains, and beans  -limit food that are high in fat and processed sugars, such as fried or sweet foods  -take an over the counter or prescribed medicine for constipation

## 2023-10-11 NOTE — ANESTHESIA POSTPROCEDURE EVALUATION
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    Anesthesia Post Evaluation    Patient location during evaluation: PACU  Patient participation: complete - patient participated  Level of consciousness: awake  Pain score: 0  Pain management: adequate  Airway patency: patent  Cardiovascular status: acceptable  Respiratory status: acceptable  Hydration status: acceptable        There were no known notable events for this encounter.

## 2023-10-11 NOTE — OP NOTE
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 [S72.002K]     Procedures  Conversion of failed femoral neck fracture to left total hip arthroplasty  94904 - NM CONV PREV HIP TOT HIP ARTHRP W/WO AGRFT/ALGRFT      Surgeons      * Trey Bustos - Primary    Resident/Fellow/Other Assistant:  Patrick Hurley    Procedure Summary  Anesthesia: General  ASA: III  Anesthesia Staff: Anesthesiologist: Sasha Choudhury MD; Brenda Sheppard MD  CRNA: RUFINA Anne-CRNA  C-AA: CARISSA Morales  Estimated Blood Loss: 500 mL  Intra-op Medications: Vancomycin powder and 50 cc of TANK compound           Anesthesia Record               Intraprocedure I/O Totals          Intake    Ketamine 0.00 mL    The total shown is the total volume documented since Anesthesia Start was filed.    Phenylephrine Drip 0.00 mL    The total shown is the total volume documented since Anesthesia Start was filed.    Total Intake 0 mL       Output    Est. Blood Loss 500 mL    Total Output 500 mL       Net    Net Volume -500 mL          Specimen: No specimens collected     Staff:   Circulator: Briana Truong RN  Scrub Person: Myrtle Ford; Susan Kaufman; Patrick Hurley         Drains and/or Catheters: * None in log *    Tourniquet Times: N/A        Implants:  Implants       Type Name Action Serial No.      Joint Hip BONE CEMENT, PREP KIT, FEMORAL - [id number] Implanted       ACETABULAR SYSTEM, LINER 36MM SZ D Wasted       ACETABULAR SHELL 50MM LINER SZ D Wasted      Joint Hip SHELL, OSSEOTI, MULTIHOLE, 52 MM E - [id number] Implanted      Joint Hip SCREW, LOW-PROFILE, G7, 6.5MM X 25MM, DOME - [id number]  "Implanted      Joint Hip LINER, LONGEVITY, G7, NEUTRAL, 36MM, E - [id number] Implanted      Joint Hip FEMORAL HEAD, BIOLOX DELTA CERAMIC, 36/0 \"M\" TAPER 12/14 - [id number] Implanted       FEMORAL STEM CEMENTLESS SZ 6 Implanted               Findings: Failed femoral neck fixation with associated erosion into the acetabulum    Indications: Amanda Salcido is an 68 y.o. 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  Patient underwent open duction internal fixation of left femoral neck fracture.  Patient subsequently failed the fixation and noted displacement with first postoperative follow-up.  Discussion was had in regards to risk benefits and alternatives I did recommend return to the operating room for conversion to left total hip arthroplasty.    The patient was seen in the preoperative area. The risks, benefits, complications, treatment options, non-operative alternatives, expected recovery and outcomes were discussed with the patient. The possibilities of reaction to medication, pulmonary aspiration, injury to surrounding structures, bleeding, recurrent infection, the need for additional procedures, failure to diagnose a condition, and creating a complication requiring transfusion or operation were discussed with the patient. The patient concurred with the proposed plan, giving informed consent.  The site of surgery was properly noted/marked if necessary per policy. The patient has been actively warmed in preoperative area. Preoperative antibiotics have been ordered and given within 1 hours of incision. Venous thrombosis prophylaxis are not indicated.    Procedure Details:   Patient was taken the operating room placed supine on the Douglass table.  The bilateral legs were placed into boots and then subsequently traction was applied to the left lower extremity.  The left hip was prepped and draped as is typical for a anterior total hip procedure.  A timeout was " performed, all parties were identified, the correct surgical site was noted.  At the conclusion of the timeout, I began by utilizing prior surgical incision about the proximal lateral aspect of the thigh and incising and carried my incision down through skin and subcutaneous tissue and splitting the prior repair of the IT band and then bluntly dissecting down to the Synthes FNS system.  Subsequently proceeded then to remove the single distal locking screw and then subsequently easily removed the bolt and derotational screw.  Confirm lack of fracture intraoperatively and this was confirmed both by direct visualization as well as palpation and radiographic evaluation using fluoroscopic imaging.  Subsequently proceeded then to copiously irrigate the entire region with normal saline and Betadine solution and subsequently washed after the Betadine soak with more saline.    At this juncture, I elected to proceed with my total hip arthroplasty through an anterior approach.  Separate incision was utilized was incision carried down through skin and subcutaneous tissue identifying the tensor fascia laterally.  This was incised longitudinally in line with the incision and then the anterior deep interval was identified.  The sartorius was mobilized medially and the tensor fascia tamar was mobilized laterally.  The a sending branch of the lateral femoral circumflex was subsequently identified and cauterized using aqua mantis.  I proceeded to identify the fairly damaged capsular region and subsequently removed variety of scar tissue in the area.  At this juncture, I was able to visualize the femoral neck nonunion.  The head was subsequently removed without difficulty.  Various components of devitalized neck was subsequently removed.  I subsequently proceeded then to evaluate the acetabulum.  There had been noted destruction surrounding the likely penetration and movement of the femoral neck system within the acetabulum.   Fortunately there was no penetration through the quadrilateral plate or into the pelvic region.  I subsequently proceeded then to perform a clean up neck cut which allowed for removal of the vast majority of the residual fracture that extended posteriorly along the femoral neck.  I then proceeded to place retractors in an appropriate manner to adequately visualize the acetabulum.  I subsequently proceeded then to ream up to an appropriate press-fit acetabular shell.  This was found to be appropriate for reaming to a 51 mm reamer for a size 52 G7 acetabular shell by [employer].  This achieved excellent press-fit however a single screw was added for additional compression and rotational stability.  I then proceeded to place a standard polyethylene liner which was impacted without difficulty.    At this juncture I proceeded to evaluate and prepare my femur.  I subsequently proceeded to place the femoral elevator in a very careful manner under the gluteus chacorta tendon.  The leg was subsequently externally rotated confirmed that traction was off and then gently placed onto the floor in a cross body position.  I subsequently proceeded then to clear soft tissue off the posterior aspect of the capsule along the greater trochanter from his attachment to the femur while leaving external rotators intact.  I then proceeded to enter the canal using a box osteotome and canal finder.  I subsequent proceeded then to broached up to a size 6 Avenir complete femoral stem.  This achieved excellent rotational stability and control with no evidence of compromise of the surrounding proximal bone.  I ensured careful meticulous attention to my broaching technique to avoid any compromise of the surrounding bone given the hole on the posterior lateral aspect.  Trialed several neck and head lengths and ultimately found to be appropriate for a coxa vera high offset neck with a 36 mm diameter +0 neck length ceramic head.  Final components  were subsequently inserted without complication.  Excellent stability was achieved in the hip.  Final fluoroscopic images obtained confirming adequacy of placement of the implant and appropriate restoration of offset and length.  Wound was subsequently copiously irrigated with chlorhexidine solution, normal saline and infiltrated with veronica anesthetic compound and vancomycin powder.    Wounds were subsequently closed with #1 strata fix, 2-0 Vicryl and 3 oh strata fix.  A glue mesh dressing was subsequently applied under a Mepilex dressing.  Patient tolerated procedure well and no complications occurred.    Assistant Patrick Hurley was elemental in the positioning, draping, retracting and help with closure and dressing application.  His help was critical in performing these functions.  And as such I did perform this procedure with assistance.    Complications:  None; patient tolerated the procedure well.    Disposition: PACU - hemodynamically stable.  Condition: stable       Attending Attestation: I was present and scrubbed for the entire procedure.    Trey Bustos  Phone Number: [phone]

## 2023-10-11 NOTE — ANESTHESIA PREPROCEDURE EVALUATION
Patient: Amanda Salcido    Procedure Information       Date/Time: 10/11/23 1245    Procedure: Revision Arthroplasty Total Hip (Left: Hip) - Conversion of a prior left hip fracture to a left total hip arthroplasty    Location: [facility] / [facility]    Surgeons: Trey Bustos MD            Relevant Problems   Anesthesia (within normal limits)  History of bronchospasm during emergence/extubation with previous hip procedure.        Clinical information reviewed:   Tobacco  Allergies  Meds   Med Hx  Surg Hx   Fam Hx  Soc Hx        NPO Detail:  NPO/Void Status  Date of Last Liquid: 10/11/23  Time of Last Liquid: 0000  Date of Last Solid: 10/10/23  Time of Last Solid: 2000  Last Intake Type: Clear fluids  Time of Last Void: 0730         Physical Exam    Airway  Mallampati: II  TM distance: >3 FB  Neck ROM: full     Cardiovascular   Rhythm: regular  Rate: normal     Dental    Pulmonary   Comments: Scattered crackles, bibasilar.   Abdominal   Abdomen: soft           Anesthesia Plan    ASA 3     general     The patient is a current smoker.  Patient was not previously instructed to abstain from smoking on day of procedure.  Patient smoked on day of procedure.    intravenous induction   Postoperative administration of opioids is intended.  Anesthetic plan and risks discussed with patient.  Use of blood products discussed with patient who.    Plan discussed with CRNA.

## 2023-10-11 NOTE — INTERVAL H&P NOTE
H&P reviewed.  Notable changes to the history and physical include status post ORIF of the left femoral neck with subsequent failure of the construct and displacement at the fracture site.  Patient returns to the operating room for revision surgery of the left hip with conversion of left hip surgery to left total hip through an anterior approach.  Patient has a well-healing surgical incision over the proximal lateral aspect of the left thigh.

## 2023-10-11 NOTE — CARE PLAN
Problem: Pain - Adult  Goal: Verbalizes/displays adequate comfort level or baseline comfort level  Outcome: Progressing     Problem: Safety - Adult  Goal: Free from fall injury  Outcome: Progressing     Problem: Discharge Planning  Goal: Discharge to home or other facility with appropriate resources  Outcome: Progressing     Problem: Chronic Conditions and Co-morbidities  Goal: Patient's chronic conditions and co-morbidity symptoms are monitored and maintained or improved  Outcome: Progressing     Problem: Pain  Goal: Takes deep breaths with improved pain control throughout the shift  Outcome: Progressing  Goal: Turns in bed with improved pain control throughout the shift  Outcome: Progressing  Goal: Walks with improved pain control throughout the shift  Outcome: Progressing  Goal: Performs ADL's with improved pain control throughout shift  Outcome: Progressing  Goal: Participates in PT with improved pain control throughout the shift  Outcome: Progressing  Goal: Free from opioid side effects throughout the shift  Outcome: Progressing  Goal: Free from acute confusion related to pain meds throughout the shift  Outcome: Progressing

## 2023-10-12 VITALS
SYSTOLIC BLOOD PRESSURE: 102 MMHG | RESPIRATION RATE: 16 BRPM | TEMPERATURE: 98.6 F | WEIGHT: 150 LBS | HEIGHT: 65 IN | BODY MASS INDEX: 24.99 KG/M2 | HEART RATE: 71 BPM | OXYGEN SATURATION: 96 % | DIASTOLIC BLOOD PRESSURE: 58 MMHG

## 2023-10-12 LAB
ANION GAP SERPL CALC-SCNC: 9 MMOL/L (ref 10–20)
BUN SERPL-MCNC: 18 MG/DL (ref 6–23)
CALCIUM SERPL-MCNC: 8.5 MG/DL (ref 8.6–10.3)
CHLORIDE SERPL-SCNC: 104 MMOL/L (ref 98–107)
CO2 SERPL-SCNC: 25 MMOL/L (ref 21–32)
CREAT SERPL-MCNC: 0.79 MG/DL (ref 0.5–1.05)
GFR SERPL CREATININE-BSD FRML MDRD: 83 ML/MIN/1.73M*2
GLUCOSE SERPL-MCNC: 94 MG/DL (ref 74–99)
POTASSIUM SERPL-SCNC: 4.4 MMOL/L (ref 3.5–5.3)
SODIUM SERPL-SCNC: 134 MMOL/L (ref 136–145)

## 2023-10-12 PROCEDURE — 97161 PT EVAL LOW COMPLEX 20 MIN: CPT | Mod: GP

## 2023-10-12 PROCEDURE — 36415 COLL VENOUS BLD VENIPUNCTURE: CPT | Performed by: ORTHOPAEDIC SURGERY

## 2023-10-12 PROCEDURE — 2500000001 HC RX 250 WO HCPCS SELF ADMINISTERED DRUGS (ALT 637 FOR MEDICARE OP): Performed by: ORTHOPAEDIC SURGERY

## 2023-10-12 PROCEDURE — 97530 THERAPEUTIC ACTIVITIES: CPT | Mod: GP

## 2023-10-12 PROCEDURE — 7100000011 HC EXTENDED STAY RECOVERY HOURLY - NURSING UNIT

## 2023-10-12 PROCEDURE — 80048 BASIC METABOLIC PNL TOTAL CA: CPT | Performed by: ORTHOPAEDIC SURGERY

## 2023-10-12 PROCEDURE — 97165 OT EVAL LOW COMPLEX 30 MIN: CPT | Mod: GO

## 2023-10-12 PROCEDURE — 97116 GAIT TRAINING THERAPY: CPT | Mod: GP

## 2023-10-12 PROCEDURE — 2500000004 HC RX 250 GENERAL PHARMACY W/ HCPCS (ALT 636 FOR OP/ED): Mod: JZ | Performed by: ORTHOPAEDIC SURGERY

## 2023-10-12 PROCEDURE — 97535 SELF CARE MNGMENT TRAINING: CPT | Mod: GO

## 2023-10-12 RX ORDER — LEVOTHYROXINE SODIUM 150 UG/1
150 TABLET ORAL
Status: DISCONTINUED | OUTPATIENT
Start: 2023-10-13 | End: 2023-10-12 | Stop reason: HOSPADM

## 2023-10-12 RX ORDER — HYDROCODONE BITARTRATE AND ACETAMINOPHEN 5; 325 MG/1; MG/1
1 TABLET ORAL EVERY 6 HOURS PRN
Qty: 28 TABLET | Refills: 0 | Status: SHIPPED | OUTPATIENT
Start: 2023-10-12 | End: 2023-10-19

## 2023-10-12 RX ORDER — POLYETHYLENE GLYCOL 3350 17 G/17G
17 POWDER, FOR SOLUTION ORAL DAILY
Qty: 7 PACKET | Refills: 0 | Status: SHIPPED | OUTPATIENT
Start: 2023-10-13 | End: 2023-10-20

## 2023-10-12 RX ORDER — ASPIRIN 81 MG/1
81 TABLET ORAL 2 TIMES DAILY
Qty: 60 TABLET | Refills: 0 | Status: SHIPPED | OUTPATIENT
Start: 2023-10-12 | End: 2023-11-11

## 2023-10-12 RX ADMIN — ASPIRIN 81 MG: 81 TABLET, COATED ORAL at 09:07

## 2023-10-12 RX ADMIN — OXYCODONE HYDROCHLORIDE 5 MG: 5 TABLET ORAL at 03:16

## 2023-10-12 RX ADMIN — KETOROLAC TROMETHAMINE 15 MG: 15 INJECTION, SOLUTION INTRAMUSCULAR; INTRAVENOUS at 05:34

## 2023-10-12 RX ADMIN — ACETAMINOPHEN 650 MG: 325 TABLET ORAL at 05:43

## 2023-10-12 RX ADMIN — KETOROLAC TROMETHAMINE 15 MG: 15 INJECTION, SOLUTION INTRAMUSCULAR; INTRAVENOUS at 11:59

## 2023-10-12 RX ADMIN — ACETAMINOPHEN 650 MG: 325 TABLET ORAL at 12:00

## 2023-10-12 RX ADMIN — CEFAZOLIN SODIUM 2 G: 2 INJECTION, SOLUTION INTRAVENOUS at 05:34

## 2023-10-12 ASSESSMENT — PAIN SCALES - GENERAL
PAINLEVEL_OUTOF10: 3
PAINLEVEL_OUTOF10: 1
PAINLEVEL_OUTOF10: 0 - NO PAIN
PAINLEVEL_OUTOF10: 2
PAINLEVEL_OUTOF10: 2
PAINLEVEL_OUTOF10: 3
PAINLEVEL_OUTOF10: 5 - MODERATE PAIN

## 2023-10-12 ASSESSMENT — COGNITIVE AND FUNCTIONAL STATUS - GENERAL
TURNING FROM BACK TO SIDE WHILE IN FLAT BAD: A LITTLE
CLIMB 3 TO 5 STEPS WITH RAILING: A LITTLE
HELP NEEDED FOR BATHING: A LITTLE
STANDING UP FROM CHAIR USING ARMS: A LITTLE
WALKING IN HOSPITAL ROOM: A LITTLE
TOILETING: A LITTLE
WALKING IN HOSPITAL ROOM: A LITTLE
STANDING UP FROM CHAIR USING ARMS: A LITTLE
MOBILITY SCORE: 18
MOVING TO AND FROM BED TO CHAIR: A LITTLE
MOVING FROM LYING ON BACK TO SITTING ON SIDE OF FLAT BED WITH BEDRAILS: A LITTLE
MOBILITY SCORE: 19
TURNING FROM BACK TO SIDE WHILE IN FLAT BAD: A LITTLE
DRESSING REGULAR LOWER BODY CLOTHING: A LITTLE
CLIMB 3 TO 5 STEPS WITH RAILING: A LITTLE
MOVING TO AND FROM BED TO CHAIR: A LITTLE
DAILY ACTIVITIY SCORE: 21

## 2023-10-12 ASSESSMENT — PAIN - FUNCTIONAL ASSESSMENT
PAIN_FUNCTIONAL_ASSESSMENT: 0-10

## 2023-10-12 ASSESSMENT — ACTIVITIES OF DAILY LIVING (ADL)
ADL_ASSISTANCE: INDEPENDENT
HOME_MANAGEMENT_TIME_ENTRY: 12
ADL_ASSISTANCE: INDEPENDENT

## 2023-10-12 NOTE — DISCHARGE SUMMARY
Discharge Diagnosis  S/P total left hip arthroplasty    Issues Requiring Follow-Up  Conversion to left total hip arthroplasty    Test Results Pending At Discharge  Pending Labs       Order Current Status    Extra Tubes Preliminary result    Lavender Top Preliminary result            Hospital Course   68-year-old female that suffered lefta mechanical fall with resultant left displaced femoral neck fracture and underwent a Open reduction internal fixation of left femoral neck fracture on September 19, 2023.  Patient continued to have pain limiting her ability to ambulate and in further discussion with Dr. Shrestha elected to proceed with a conversion to left total arthroplasty on 10/11/2023.  Patient had satisfactory postoperative course.  Fully participated in therapy and was compliant with 50% weightbearing on left lower extremity.  ASA 81 mg p.o. twice daily was used for VTE prophylaxis and patient was provide with a prescription.  Patient was also provided with a prescription for Vicodin 5/325 p.o. every 6 as needed for pain, was educated on its use and disposal.  Patient was discharged to home in satisfactory condition with home health care.    Pertinent Physical Exam At Time of Discharge  Physical Exam    Home Medications     Medication List      START taking these medications     polyethylene glycol 17 gram packet; Commonly known as: Glycolax,   Miralax; Take 17 g by mouth once daily for 7 days. Do not start before   October 13, 2023.; Start taking on: October 13, 2023     CHANGE how you take these medications     aspirin 81 mg EC tablet; Take 1 tablet (81 mg) by mouth 2 times a day.;   What changed: when to take this   HYDROcodone-acetaminophen 5-325 mg tablet; Commonly known as: Norco;   Take 1 tablet by mouth every 6 hours if needed for moderate pain (4 - 6)   or severe pain (7 - 10) for up to 7 days.; What changed: when to take   this, reasons to take this     CONTINUE taking these medications     docusate  sodium 100 mg capsule; Commonly known as: Colace   ibuprofen 400 mg tablet   levothyroxine 150 mcg tablet; Commonly known as: Synthroid, Levoxyl       Outpatient Follow-Up  No future appointments.    Mellisa Johnson PA-C

## 2023-10-12 NOTE — CARE PLAN
Problem: Dressings Lower Extremities MOD I  Goal: STG - Patient to complete lower body dressing MOD I  Outcome: Progressing     Problem: Grooming  Goal: STG - Patient completes grooming MOD I  Outcome: Progressing     Problem: Transfers  Goal: STG - Patient will perform toilet transfer MOD I  Outcome: Progressing  Goal: STG - Patient maintains weight bearing status during transfers  Outcome: Progressing

## 2023-10-12 NOTE — CARE PLAN
Problem: PT Problem  Goal: Pt will perform bed mobility with mod I.   Outcome: Progressing  Goal: Pt will complete sit <> stand and bed <> chair transfers with mod I.    Outcome: Progressing  Goal: Pt will ambulate 100 feet mod I using FWW and will maintain 50% PWB.    Outcome: Progressing  Goal: Pt will verbalize and demonstrate understanding of NILAM supine/seated exercises.   Outcome: Progressing  Goal: Pt will ascend/descend at least 4 stairs using rail and crutch in order to navigate home environment.    Outcome: Progressing

## 2023-10-12 NOTE — NURSING NOTE
Reviewed discharge instructions with the patient and her daughter. All questions answered. Prescriptions sent to her pharmacy. Her walker was sent home with her as well as all of her belongings

## 2023-10-12 NOTE — PROGRESS NOTES
Physical Therapy    Physical Therapy Treatment    Patient Name: Amanda Salcido  MRN: [id number]  Today's Date: 10/12/2023  Time Calculation  Start Time: 1230  Stop Time: 1257  Time Calculation (min): 27 min       Assessment/Plan   PT Assessment  PT Assessment Results: Decreased strength, Decreased endurance, Impaired balance, Decreased mobility, Decreased range of motion, Pain, Orthopedic restrictions  Rehab Prognosis: Good  Evaluation/Treatment Tolerance: Patient tolerated treatment well  Medical Staff Made Aware: Yes  Strengths: Ability to acquire knowledge, Attitude of self, Capable of completing ADLs semi/independent, Coping skills, Premorbid level of function, Support of Caregivers, Support of extended family/friends  End of Session Communication: Bedside nurse  Assessment Comment:   End of Session Patient Position: Up in chair, Alarm on     PT Plan  Treatment/Interventions: Bed mobility, Transfer training, Gait training, Stair training, Balance training, Endurance training, Strengthening  PT Plan: Skilled PT  PT Frequency: BID  PT Discharge Recommendations: Low intensity level of continued care  PT Recommended Transfer Status: Assist x1 (FWW, gait belt)      General Visit Information:   PT  Visit  PT Received On: 10/12/23  General  Reason for Referral: left NILAM  Prior to Session Communication: Bedside nurse  Patient Position Received: Up in chair, Alarm on  General Comment: Pt agreeable to therapy and cleared by nurse.    Subjective   Precautions:  Precautions  LE Weight Bearing Status: Left Partial Weight Bearing (50% LLE)  Medical Precautions: Fall precautions  Post-Surgical Precautions:  (anterior hip precautions)  Vital Signs:  Vital Signs  Heart Rate Source: Monitor  BP Location: Right arm  BP Method: Automatic  Patient Position: Lying    Objective   Pain:  Pain Assessment  Pain Assessment: 0-10  Pain Score: 0 - No pain  Cognition:     Postural Control:     Extremity/Trunk Assessments:    Activity  Tolerance:     Treatments:       Ambulation/Gait Training  Ambulation/Gait Training Performed: Yes  Ambulation/Gait Training 1  Surface 1: Level tile  Device 1: Rolling walker  Gait Support Devices: Gait belt  Assistance 1: Close supervision  Quality of Gait 1:  (step to gait, maintains 50% PWB LLE)  Comments/Distance (ft) 1: 80'x1, 150'x1  Transfers  Transfer: Yes  Transfer 1  Transfer From 1: Sit to  Transfer to 1: Stand  Technique 1: Sit to stand, Stand to sit  Transfer Device 1: Walker, Gait belt  Transfer Level of Assistance 1:  (SBA)  Trials/Comments 1: maintains PWB    Stairs  Stairs: Yes  Stairs  Rails 1: Right  Device 1: Single crutch  Support Devices 1: Gait belt  Assistance 1: Contact guard, Close supervision  Comment/Number of Steps 1: 6    Outcome Measures:  [facility] Basic Mobility  Turning from your back to your side while in a flat bed without using bedrails: None  Moving from lying on your back to sitting on the side of a flat bed without using bedrails: A little  Moving to and from bed to chair (including a wheelchair): A little  Standing up from a chair using your arms (e.g. wheelchair or bedside chair): A little  To walk in hospital room: A little  Climbing 3-5 steps with railing: A little  Basic Mobility - Total Score: 19    Education Documentation  Precautions, taught by Ana Chavez PTA at 10/12/2023  1:15 PM.  Learner: Family, Patient  Readiness: Acceptance  Method: Explanation, Demonstration  Response: Verbalizes Understanding, Demonstrated Understanding    Body Mechanics, taught by Ana Chavez PTA at 10/12/2023  1:15 PM.  Learner: Family, Patient  Readiness: Acceptance  Method: Explanation, Demonstration  Response: Verbalizes Understanding, Demonstrated Understanding    Home Exercise Program, taught by Ana Chavez PTA at 10/12/2023  1:15 PM.  Learner: Family, Patient  Readiness: Acceptance  Method: Explanation, Demonstration  Response: Verbalizes Understanding, Demonstrated  Understanding    Mobility Training, taught by Ana Chavez PTA at 10/12/2023  1:15 PM.  Learner: Family, Patient  Readiness: Acceptance  Method: Explanation, Demonstration  Response: Verbalizes Understanding, Demonstrated Understanding    Education Comments  No comments found.        OP EDUCATION:       Encounter Problems       Encounter Problems (Active)       PT Problem       Pt will perform bed mobility with mod I.  (Progressing)       Start:  10/12/23    Expected End:  10/26/23            Pt will complete sit <> stand and bed <> chair transfers with mod I.   (Progressing)       Start:  10/12/23    Expected End:  10/26/23            Pt will ambulate 100 feet mod I using FWW and will maintain 50% PWB.   (Progressing)       Start:  10/12/23    Expected End:  10/26/23            Pt will verbalize and demonstrate understanding of NILAM supine/seated exercises.  (Progressing)       Start:  10/12/23    Expected End:  10/26/23            Pt will ascend/descend at least 4 stairs using rail and crutch in order to navigate home environment.   (Progressing)       Start:  10/12/23    Expected End:  10/26/23

## 2023-10-12 NOTE — PROGRESS NOTES
"Amanda Salcido is a 68 y.o. female on day 0 of admission presenting with S/P total left hip arthroplasty.    Subjective   Feeling much improved after surgery than before surgery.  Grateful that fracture has been repaired.  Currently having little to no pain.  Expects that pain will increase with movement or activity.  No complaints of chest pain, shortness of breath, lightheaded or dizziness.  Tolerating a diet with no nausea vomiting.  Plans on discharge to home with home health care.       Objective     Physical Exam  Constitutional:       Appearance: Normal appearance.   HENT:      Head: Normocephalic and atraumatic.      Nose: Nose normal.      Mouth/Throat:      Mouth: Mucous membranes are moist.      Pharynx: Oropharynx is clear.   Eyes:      Pupils: Pupils are equal, round, and reactive to light.   Cardiovascular:      Rate and Rhythm: Normal rate and regular rhythm.   Pulmonary:      Effort: Pulmonary effort is normal.      Breath sounds: Normal breath sounds.   Abdominal:      General: Bowel sounds are normal.      Palpations: Abdomen is soft.   Musculoskeletal:      Cervical back: Normal range of motion and neck supple.      Comments: Left anterior hip area with surgical dressings x2 clean dry and intact, distally sensation is present throughout left lower extremity, plantar and dorsiflexion against resistance in left foot, DP pulse palpable left foot   Skin:     General: Skin is warm and dry.   Neurological:      General: No focal deficit present.      Mental Status: She is alert and oriented to person, place, and time.   Psychiatric:         Mood and Affect: Mood normal.         Last Recorded Vitals  Blood pressure 102/58, pulse 71, temperature 37 °C (98.6 °F), temperature source Temporal, resp. rate 16, height 1.651 m (5' 5\"), weight 68 kg (150 lb), SpO2 96 %.  Intake/Output last 3 Shifts:  I/O last 3 completed shifts:  In: 1496.7 (22 mL/kg) [I.V.:1396.7 (20.5 mL/kg); IV Piggyback:100]  Out: 500 " (7.3 mL/kg) [Blood:500]  Dosing Weight: 68 kg     Relevant Results     Scheduled medications  acetaminophen, 650 mg, oral, q6h YUE  aspirin, 81 mg, oral, BID  ketorolac, 15 mg, intravenous, q6h  polyethylene glycol, 17 g, oral, Daily      Continuous medications  lactated Ringer's, 50 mL/hr, Last Rate: 50 mL/hr (10/12/23 0241)  oxygen, 2 L/min      PRN medications  PRN medications: bisacodyl, bisacodyl, cyclobenzaprine, HYDROmorphone, naloxone, naloxone, naloxone, ondansetron ODT **OR** ondansetron, oxyCODONE, oxyCODONE   Results for orders placed or performed during the hospital encounter of 10/11/23 (from the past 24 hour(s))   Basic metabolic panel   Result Value Ref Range    Glucose 94 74 - 99 mg/dL    Sodium 134 (L) 136 - 145 mmol/L    Potassium 4.4 3.5 - 5.3 mmol/L    Chloride 104 98 - 107 mmol/L    Bicarbonate 25 21 - 32 mmol/L    Anion Gap 9 (L) 10 - 20 mmol/L    Urea Nitrogen 18 6 - 23 mg/dL    Creatinine 0.79 0.50 - 1.05 mg/dL    eGFR 83 >60 mL/min/1.73m*2    Calcium 8.5 (L) 8.6 - 10.3 mg/dL   Lavender Top   Result Value Ref Range    Extra Tube Hold for add-ons.                  Assessment/Plan   Principal Problem:    S/P total left hip arthroplasty    failed femoral neck fracture   Postop day #1 of conversion of previous hip fracture to left total hip arthroplasty  Physical and Occupational Therapy are on consult  50% partial Weightbearing with walker  Pain controlled with current pain regime  ASA for VTE prophylaxis  Appropriate home medications ordered for chronic medical conditions  Bowel regime  Labs reviewed  Plan is for discharge home with home health care  Follow-up with Dr. Bustos as directed       I spent 30 minutes in the professional and overall care of this patient.      Mellisa Johnson PA-C

## 2023-10-12 NOTE — PROGRESS NOTES
Met with patient at bedside. Admitted for left total hip replacement. Pt lives with adult children and grandchildren and was independent PTA. Pt has a walker. PT/OT evals pending. Pt plans [facility]. They are schedule to come to her home 10/13. PND notified of dispo plan. Family will provide transport home.

## 2023-10-12 NOTE — PROGRESS NOTES
Physical Therapy    Physical Therapy Evaluation    Patient Name: Amanda Salcido  MRN: [id number]  Today's Date: 10/12/2023   Time Calculation  Start Time: 1058  Stop Time: 1123  Time Calculation (min): 25 min    Assessment/Plan   PT Assessment  PT Assessment Results: Decreased strength, Decreased endurance, Impaired balance, Decreased mobility, Decreased range of motion, Pain, Orthopedic restrictions  Rehab Prognosis: Good  Evaluation/Treatment Tolerance: Patient tolerated treatment well  Medical Staff Made Aware: Yes  Strengths: Ability to acquire knowledge, Attitude of self, Capable of completing ADLs semi/independent, Coping skills, Premorbid level of function, Support of Caregivers, Support of extended family/friends  End of Session Communication: Bedside nurse  Assessment Comment: Pt presents today below baseline level of function and requires continued PT during hospital stay. Pt requires PT at a low intensity level at discharge to maximize functional mobility and safety.    End of Session Patient Position: Up in chair, Alarm on  IP OR SWING BED PT PLAN  Inpatient or Swing Bed: Inpatient  PT Plan  Treatment/Interventions: Bed mobility, Transfer training, Gait training, Stair training, Balance training, Strengthening, Therapeutic exercise, Therapeutic activity, Endurance training, Home exercise program, Neuromuscular re-education, Range of motion  PT Plan: Skilled PT  PT Frequency: BID  PT Discharge Recommendations: Low intensity level of continued care  PT Recommended Transfer Status: Assist x1 (FWW, gait belt)    Subjective     Current Problem:  1. S/P total left hip arthroplasty          Past Medical History:   Diagnosis Date    Hypothyroidism      History reviewed. No pertinent surgical history.    General Visit Information:  General  Reason for Referral: left NILAM  Referred By: Dr. Trey Bustos  Past Medical History Relevant to Rehab: POD 1  Family/Caregiver Present: Yes  Co-Treatment: OT  Co-Treatment  Reason: to maximize functional mobility and safety  Prior to Session Communication: Bedside nurse  Patient Position Received: Bed, 3 rail up  Preferred Learning Style: verbal  General Comment: Pt agreeable to PT, nursing cleared for treatment.    Home Living:  Home Living  Type of Home: House  Lives With: Spouse, Adult children  Home Adaptive Equipment: Walker rolling or standard, Cane (wheelchair is ordered)  Home Layout: One level  Home Access: Stairs to enter with rails  Entrance Stairs-Rails: Both  Entrance Stairs-Number of Steps: 4-5  Bathroom Shower/Tub: Walk-in shower  Bathroom Equipment: Grab bars in shower (SC)  Home Living Comments: family can assist at discharge    Prior Level of Function:  Prior Function Per Pt/Caregiver Report  Level of [place]: Independent with ADLs and functional transfers  ADL Assistance: Independent  Homemaking Assistance: Independent  Ambulatory Assistance: Independent (recently pt has been using walker due to weight bearing restrictions prior to surgery)    Precautions:  Precautions  LE Weight Bearing Status:  (50% PWB left LE)  Medical Precautions: Fall precautions  Post-Surgical Precautions:  (anterior hip precautions)    Vital Signs:     Objective     Pain:  Pain Assessment  Pain Assessment: 0-10  Pain Score: 2  Pain Type: Surgical pain  Pain Location: Hip  Pain Orientation: Left    Cognition:  Cognition  Overall Cognitive Status: Within Functional Limits    General Assessments:      Activity Tolerance  Endurance: Endurance does not limit participation in activity  Sensation  Sensation Comment: denies numbness and tingling              Static Sitting Balance  Static Sitting-Comment/Number of Minutes: good  Dynamic Sitting Balance  Dynamic Sitting-Comments: good  Static Standing Balance  Static Standing-Comment/Number of Minutes: fair  Dynamic Standing Balance  Dynamic Standing-Comments: fair    Functional Assessments:     Bed Mobility  Bed Mobility: Yes  Bed Mobility  1  Bed Mobility 1: Supine to sitting  Level of Assistance 1:  (SBA)  Transfers  Transfer: Yes  Transfer 1  Transfer From 1: Sit to  Transfer to 1: Stand  Transfer Device 1: Walker, Gait belt  Transfer Level of Assistance 1:  (SBA)  Trials/Comments 1: maintains PWB  Transfers 2  Transfer From 2: Stand to  Transfer to 2: Sit  Transfer Device 2: Walker  Transfer Level of Assistance 2:  (SBA)  Trials/Comments 2: maintains PWB well  Ambulation/Gait Training  Ambulation/Gait Training Performed: Yes  Ambulation/Gait Training 1  Device 1: Rolling walker  Gait Support Devices: Gait belt  Assistance 1: Close supervision  Quality of Gait 1:  (Step to gait. Maintains 50% PWB with verbal cueing.)  Comments/Distance (ft) 1: 80 feet x 2      Treatment    Education and demo provided on 50% PWB; PWB precaution reinforced throughout mobility. Cues for safe hand placement with use of FWW for sit<>stand. Instruction provided in step to gait pattern with cues given for sequencing with FWW and increased use of UE's. Cues given for turning strategy to avoid pivoting on left LE.     Instruction provided in ankle pumps, quad sets, glute sets in order to maximize strength and circulation. Cues given for proper technique and parameters.         Extremity/Trunk Assessments:        RLE   RLE : Within Functional Limits  LLE   LLE : Exceptions to WFL  AROM LLE (degrees)  LLE AROM Comment: WLF except hip 50-75%  Strength LLE  LLE Overall Strength: Greater than or equal to 3/5 as evidenced by functional mobility    Outcome Measures:  [facility] Basic Mobility  Turning from your back to your side while in a flat bed without using bedrails: A little  Moving from lying on your back to sitting on the side of a flat bed without using bedrails: A little  Moving to and from bed to chair (including a wheelchair): A little  Standing up from a chair using your arms (e.g. wheelchair or bedside chair): A little  To walk in hospital room: A little  Climbing 3-5  steps with railing: A little  Basic Mobility - Total Score: 18                            Goals:  Encounter Problems       Encounter Problems (Active)       PT Problem       Pt will perform bed mobility with mod I.  (Progressing)       Start:  10/12/23    Expected End:  10/26/23            Pt will complete sit <> stand and bed <> chair transfers with mod I.   (Progressing)       Start:  10/12/23    Expected End:  10/26/23            Pt will ambulate 100 feet mod I using FWW and will maintain 50% PWB.   (Progressing)       Start:  10/12/23    Expected End:  10/26/23            Pt will verbalize and demonstrate understanding of NILAM supine/seated exercises.  (Progressing)       Start:  10/12/23    Expected End:  10/26/23            Pt will ascend/descend at least 4 stairs using rail and crutch in order to navigate home environment.   (Progressing)       Start:  10/12/23    Expected End:  10/26/23                 Education Documentation  Precautions, taught by Rosanna Sharpe PT at 10/12/2023  1:12 PM.  Learner: Patient  Readiness: Acceptance  Method: Explanation, Demonstration  Response: Verbalizes Understanding, Demonstrated Understanding    Body Mechanics, taught by Rosanna Sharpe PT at 10/12/2023  1:12 PM.  Learner: Patient  Readiness: Acceptance  Method: Explanation, Demonstration  Response: Verbalizes Understanding, Demonstrated Understanding    Home Exercise Program, taught by Rosanna Sharpe PT at 10/12/2023  1:12 PM.  Learner: Patient  Readiness: Acceptance  Method: Explanation, Demonstration  Response: Verbalizes Understanding, Demonstrated Understanding    Mobility Training, taught by Rosanna Sharpe PT at 10/12/2023  1:12 PM.  Learner: Patient  Readiness: Acceptance  Method: Explanation, Demonstration  Response: Verbalizes Understanding, Demonstrated Understanding    Education Comments  No comments found.

## 2023-10-12 NOTE — PROGRESS NOTES
Occupational Therapy    Occupational Therapy    Evaluation/Treatment    Patient Name: Amanda Salcido  MRN: [id number]  : 1955  Today's Date: 10/12/23  Time Calculation  Start Time: 1059  Stop Time: 1121  Time Calculation (min): 22 min       Assessment:  OT Assessment:  (Pt. benefits from low intensity therapy to increase safety and independence in ADLs and mobility to return to PLOF)  Prognosis: Good  Barriers to Discharge: None  Evaluation/Treatment Tolerance: Patient tolerated treatment well  OT Assessment Results: Decreased ADL status, Decreased functional mobility  Prognosis: Good  Barriers to Discharge: None  Evaluation/Treatment Tolerance: Patient tolerated treatment well  Strengths: Ability to acquire knowledge, Attitude of self, Capable of completing ADLs semi/independent, Coping skills, Support of Caregivers    Plan:  Treatment Interventions: ADL retraining, Functional transfer training  OT Frequency: 1 time per day until discharge  OT Discharge Recommendations: Low intensity level of continued care  OT Recommended Transfer Status: Stand by assist  Treatment Interventions: ADL retraining, Functional transfer training  Subjective     Current Problem:  1. S/P total left hip arthroplasty          Past Medical History:   Diagnosis Date    Hypothyroidism      History reviewed. No pertinent surgical history.    General:      General  Reason for Referral: ADLs,discharge planning  Referred By:   Family/Caregiver Present: Yes (Daughter and grandson)  Prior to Session Communication: Bedside nurse  Patient Position Received: Bed, 3 rail up, Alarm on  Preferred Learning Style: verbal    Precautions:  LE Weight Bearing Status:  (50% left LE)  Medical Precautions: Fall precautions  Post-Surgical Precautions:  (Anterior hip precautions)    Vital Signs:       Pain:  Pain Assessment  Pain Assessment: 0-10  Pain Score: 2  Pain Type: Surgical pain  Pain Location: Hip  Pain Orientation: Left  Objective      Cognition:  Overall Cognitive Status: Within Functional Limits  Attention: Within Functional Limits        Home Living:  Home Living Comments:  (Pt. lives with spouse,daughter and her family in ranch home with 5 entry steps,has walk in shower with shower chair,grab bars and shower chair.Owns ww.)    Prior Function:  Level of [place]: Independent with ADLs and functional transfers  Receives Help From: Family  ADL Assistance: Independent  Homemaking Assistance: Independent  Ambulatory Assistance: Independent (pt. had been [facility]B prior to surgery)      ADL's:  Grooming Assistance: Stand by  LE Dressing Assistance: Minimal  Pt. Educated on LB dressing techniques with good follow through and understanding,dons pants      Activity Tolerance:  Endurance: Endurance does not limit participation in activity      Bed Mobility/Transfers: Bed Mobility  Bed Mobility:  (SBA)  Transfers  Transfer:  (sit <-> stand SBA,functional mobility SBA with ww in halls)  Pt. Able to maintain WB status throughout visit     Hand Function:  Hand Function  Gross Grasp: Functional  Coordination: Functional    Extremities: RUE   RUE : Within Functional Limits and LUE   LUE: Within Functional Limits    Outcome Measures: [facility] Daily Activity  Putting on and taking off regular lower body clothing: A little  Bathing (including washing, rinsing, drying): A little  Putting on and taking off regular upper body clothing: None  Toileting, which includes using toilet, bedpan or urinal: A little  Taking care of personal grooming such as brushing teeth: None  Eating Meals: None  Daily Activity - Total Score: 21      EDUCATION:  Education  Individual(s) Educated: Patient, Child  Education Provided: Fall precautons, Risk and benefits of OT discussed with patient or other, POC discussed and agreed upon  Patient Response to Education: Patient/Caregiver Verbalized Understanding of Information    Goals:  Encounter Problems       Encounter Problems (Active)        Dressings Lower Extremities MOD I       STG - Patient to complete lower body dressing MOD I (Progressing)       Start:  10/12/23    Expected End:  10/26/23               Grooming       STG - Patient completes grooming MOD I (Progressing)       Start:  10/12/23    Expected End:  10/26/23               Transfers       STG - Patient will perform toilet transfer MOD I (Progressing)       Start:  10/12/23    Expected End:  10/26/23            STG - Patient maintains weight bearing status during transfers (Progressing)       Start:  10/12/23    Expected End:  10/26/23

## 2023-12-13 LAB — HOLD SPECIMEN: NORMAL

## (undated) DEVICE — SOLUTION, IRRIGATION, USP, SODIUM CHLORIDE 0.9%, 3000 ML, BAG

## (undated) DEVICE — IRRIGATION SET, CYSTOSCOPY, REGULATING CLAMP, STRAIGHT, 81 IN

## (undated) DEVICE — SKIN CLOSURE SYS, PREMIERPRO EXOFIN, 1-4CM X 22CM, 1.75G TUBE

## (undated) DEVICE — CEMENT MIX KIT, REVOLUTION, W/BREAKAWAY

## (undated) DEVICE — TOWEL PACK, STERILE, 4/PACK, BLUE

## (undated) DEVICE — SUTURE, STRATAFIX, SPIRAL MONOCRYL PLUS, 3-0, PS-2 45CM, UNDYED

## (undated) DEVICE — IMPLANTABLE DEVICE: Type: IMPLANTABLE DEVICE | Site: HIP | Status: NON-FUNCTIONAL

## (undated) DEVICE — BLADE, STRYKER, OSC, 19 X 90 X 1.27G

## (undated) DEVICE — GLOVE, SURGICAL, PROTEXIS PI BLUE W/NEUTHERA, 7.5, PF, LF

## (undated) DEVICE — SOLUTION, IRRIGATION, SODIUM CHLORIDE 0.9%, 1000 ML, POUR BOTTLE

## (undated) DEVICE — DRAPE, SHEET, THREE QUARTER, FAN FOLD, 57 X 77 IN

## (undated) DEVICE — DRESSING, MEPILEX BORDER, POST-OP AG, 4 X 10 IN

## (undated) DEVICE — DRAPE, C-ARM IMAGE

## (undated) DEVICE — SUTURE, VICRYL, 2-0, 36 IN, CT-1, UNDYED

## (undated) DEVICE — GOWN, SURGICAL, NON-REINFORCED, XLARGE, LF

## (undated) DEVICE — DRILL BIT, RNGLC +ACET, 3.2MM X 30MM

## (undated) DEVICE — Device

## (undated) DEVICE — NEEDLE, SPINAL, QUINCKE, 18 G X 3.5 IN, PINK HUB

## (undated) DEVICE — SUTURE, VICRYL, 1, 36 IN, CT-1, UNDYED

## (undated) DEVICE — SEALER, BIPOLAR, AQUA MANTYS 6.0

## (undated) DEVICE — BANDAGE, COFLEX, 6 X 5 YDS, FOAM TAN, STERILE, LF

## (undated) DEVICE — SYRINGE, 10 CC, LUER LOCK

## (undated) DEVICE — SUTURE, STARTAFIX, SYMMETRIC, PDS PLUS, 60CM CT-1

## (undated) DEVICE — DRAPE, ISOLATION, ANTIMICROBIAL, IOBAN, W/FILM & POUCH, LARGE, 32 X 70 CM

## (undated) DEVICE — IRRIGATION SYSTEM, WOUND, SURGIPHOR, 450ML, STERILE

## (undated) DEVICE — WOUND SYSTEM, DEBRIDEMENT & CLEANING, O.R DUOPAK

## (undated) DEVICE — HOOD, STERISHIELD T4 SYSTEM